# Patient Record
Sex: MALE | Race: WHITE | NOT HISPANIC OR LATINO | Employment: OTHER | URBAN - METROPOLITAN AREA
[De-identification: names, ages, dates, MRNs, and addresses within clinical notes are randomized per-mention and may not be internally consistent; named-entity substitution may affect disease eponyms.]

---

## 2023-07-05 DIAGNOSIS — J45.909 ASTHMA, UNSPECIFIED ASTHMA SEVERITY, UNSPECIFIED WHETHER COMPLICATED, UNSPECIFIED WHETHER PERSISTENT (HHS-HCC): Primary | ICD-10-CM

## 2023-07-07 RX ORDER — MONTELUKAST SODIUM 10 MG/1
TABLET ORAL
Qty: 90 TABLET | Refills: 0 | Status: SHIPPED | OUTPATIENT
Start: 2023-07-07

## 2023-10-20 ENCOUNTER — APPOINTMENT (OUTPATIENT)
Dept: PRIMARY CARE | Facility: CLINIC | Age: 78
End: 2023-10-20
Payer: MEDICARE

## 2023-10-24 ENCOUNTER — APPOINTMENT (OUTPATIENT)
Dept: PRIMARY CARE | Facility: CLINIC | Age: 78
End: 2023-10-24
Payer: MEDICARE

## 2023-10-29 DIAGNOSIS — J45.909 REACTIVE AIRWAY DISEASE WITHOUT COMPLICATION, UNSPECIFIED ASTHMA SEVERITY, UNSPECIFIED WHETHER PERSISTENT (HHS-HCC): Primary | ICD-10-CM

## 2023-10-31 RX ORDER — ALBUTEROL SULFATE 90 UG/1
1 AEROSOL, METERED RESPIRATORY (INHALATION) EVERY 4 HOURS PRN
Qty: 36 G | Refills: 3 | Status: SHIPPED | OUTPATIENT
Start: 2023-10-31

## 2024-01-05 ENCOUNTER — TELEPHONE (OUTPATIENT)
Dept: PRIMARY CARE | Facility: CLINIC | Age: 79
End: 2024-01-05
Payer: MEDICARE

## 2024-01-05 DIAGNOSIS — G50.0 TRIGEMINAL NEURALGIA: Primary | ICD-10-CM

## 2024-01-05 RX ORDER — CARBAMAZEPINE 200 MG/1
200 TABLET ORAL 2 TIMES DAILY
COMMUNITY
Start: 2019-10-27

## 2024-01-05 RX ORDER — CARBAMAZEPINE 300 MG/1
300 CAPSULE, EXTENDED RELEASE ORAL 3 TIMES DAILY
COMMUNITY
End: 2024-01-05 | Stop reason: SDUPTHER

## 2024-01-05 RX ORDER — CARBAMAZEPINE 300 MG/1
300 CAPSULE, EXTENDED RELEASE ORAL 3 TIMES DAILY
Qty: 90 CAPSULE | Refills: 0 | Status: SHIPPED | OUTPATIENT
Start: 2024-01-05 | End: 2024-05-21 | Stop reason: WASHOUT

## 2024-01-05 NOTE — TELEPHONE ENCOUNTER
Patient stopped in today and would like a refill on his prescription Carbamazephine 300 mg per capsules  [facility] [phone]       Refill request Zolpidem 10 mg tab; take one at bedtime prn  Dispense 30; 2 refills  Last reorder 9-18-18  Last visit: 12-10-18  Next visit: 1-10-19  Narcotic signed: 4-19-18  PDMP accessed   UDS: 1-9-16  Pharmacy [facility]

## 2024-01-07 ENCOUNTER — HOSPITAL ENCOUNTER (OUTPATIENT)
Dept: RADIOLOGY | Facility: EXTERNAL LOCATION | Age: 79
Discharge: HOME | End: 2024-01-07

## 2024-01-07 DIAGNOSIS — R07.81 RIB PAIN ON RIGHT SIDE: ICD-10-CM

## 2024-04-19 ENCOUNTER — PATIENT OUTREACH (OUTPATIENT)
Dept: PRIMARY CARE | Facility: CLINIC | Age: 79
End: 2024-04-19
Payer: MEDICARE

## 2024-05-21 ENCOUNTER — OFFICE VISIT (OUTPATIENT)
Dept: PRIMARY CARE | Facility: CLINIC | Age: 79
End: 2024-05-21
Payer: MEDICARE

## 2024-05-21 VITALS
DIASTOLIC BLOOD PRESSURE: 77 MMHG | SYSTOLIC BLOOD PRESSURE: 162 MMHG | WEIGHT: 162.4 LBS | HEIGHT: 67 IN | OXYGEN SATURATION: 94 % | BODY MASS INDEX: 25.49 KG/M2 | HEART RATE: 84 BPM

## 2024-05-21 DIAGNOSIS — Z87.891 SMOKING HX: ICD-10-CM

## 2024-05-21 DIAGNOSIS — I63.9 CEREBROVASCULAR ACCIDENT (CVA), UNSPECIFIED MECHANISM (MULTI): ICD-10-CM

## 2024-05-21 DIAGNOSIS — I69.354 HEMIPLEGIA AND HEMIPARESIS FOLLOWING CEREBRAL INFARCTION AFFECTING LEFT NON-DOMINANT SIDE (MULTI): ICD-10-CM

## 2024-05-21 DIAGNOSIS — Z79.899 MEDICATION MANAGEMENT: Primary | ICD-10-CM

## 2024-05-21 DIAGNOSIS — I10 HYPERTENSION, UNSPECIFIED TYPE: ICD-10-CM

## 2024-05-21 DIAGNOSIS — I48.91 ATRIAL FIBRILLATION, UNSPECIFIED TYPE (MULTI): ICD-10-CM

## 2024-05-21 DIAGNOSIS — J43.9 PULMONARY EMPHYSEMA, UNSPECIFIED EMPHYSEMA TYPE (MULTI): ICD-10-CM

## 2024-05-21 PROCEDURE — 99215 OFFICE O/P EST HI 40 MIN: CPT | Performed by: INTERNAL MEDICINE

## 2024-05-21 PROCEDURE — 3077F SYST BP >= 140 MM HG: CPT | Performed by: INTERNAL MEDICINE

## 2024-05-21 PROCEDURE — 1159F MED LIST DOCD IN RCRD: CPT | Performed by: INTERNAL MEDICINE

## 2024-05-21 PROCEDURE — 3078F DIAST BP <80 MM HG: CPT | Performed by: INTERNAL MEDICINE

## 2024-05-21 RX ORDER — IBUPROFEN 200 MG
1 TABLET ORAL
Qty: 14 PATCH | Refills: 0 | Status: SHIPPED | OUTPATIENT
Start: 2024-05-21

## 2024-05-21 RX ORDER — IPRATROPIUM BROMIDE AND ALBUTEROL 20; 100 UG/1; UG/1
1 SPRAY, METERED RESPIRATORY (INHALATION)
COMMUNITY

## 2024-05-21 RX ORDER — IBUPROFEN 200 MG
1 TABLET ORAL
COMMUNITY
Start: 2024-04-30 | End: 2024-05-21 | Stop reason: SDUPTHER

## 2024-05-21 RX ORDER — NICOTINE 7MG/24HR
1 PATCH, TRANSDERMAL 24 HOURS TRANSDERMAL EVERY 24 HOURS
Qty: 30 PATCH | Refills: 0 | Status: SHIPPED | OUTPATIENT
Start: 2024-05-21 | End: 2024-06-20

## 2024-05-21 RX ORDER — AMLODIPINE BESYLATE 10 MG/1
10 TABLET ORAL
COMMUNITY

## 2024-05-21 RX ORDER — ATORVASTATIN CALCIUM 40 MG/1
40 TABLET, FILM COATED ORAL
COMMUNITY
Start: 2024-05-08

## 2024-05-21 RX ORDER — CHOLECALCIFEROL (VITAMIN D3) 50 MCG
50 TABLET ORAL DAILY
COMMUNITY

## 2024-05-21 RX ORDER — NAPROXEN SODIUM 220 MG/1
81 TABLET, FILM COATED ORAL
COMMUNITY
Start: 2024-04-30

## 2024-05-21 RX ORDER — TALC
6 POWDER (GRAM) TOPICAL
COMMUNITY
Start: 2024-05-08

## 2024-05-21 RX ORDER — APIXABAN 2.5 MG/1
2.5 TABLET, FILM COATED ORAL 2 TIMES DAILY
COMMUNITY

## 2024-05-21 RX ORDER — LOSARTAN POTASSIUM 25 MG/1
25 TABLET ORAL
COMMUNITY
Start: 2024-05-13 | End: 2024-06-04 | Stop reason: WASHOUT

## 2024-05-21 RX ORDER — IBUPROFEN 200 MG
1 TABLET ORAL EVERY 24 HOURS
Qty: 30 PATCH | Refills: 0 | Status: SHIPPED | OUTPATIENT
Start: 2024-05-21 | End: 2024-06-20

## 2024-05-21 RX ORDER — BISMUTH SUBSALICYLATE 262 MG
1 TABLET,CHEWABLE ORAL DAILY
COMMUNITY

## 2024-05-21 RX ORDER — GUAIFENESIN 600 MG/1
600 TABLET, EXTENDED RELEASE ORAL 2 TIMES DAILY
COMMUNITY

## 2024-05-21 RX ORDER — DICLOFENAC SODIUM 10 MG/G
4 GEL TOPICAL 3 TIMES DAILY
COMMUNITY
Start: 2022-12-07

## 2024-05-21 RX ORDER — DOCUSATE SODIUM 100 MG/1
100 CAPSULE, LIQUID FILLED ORAL 2 TIMES DAILY
COMMUNITY

## 2024-05-21 RX ORDER — FLUTICASONE FUROATE AND VILANTEROL 100; 25 UG/1; UG/1
POWDER RESPIRATORY (INHALATION)
COMMUNITY

## 2024-05-21 ASSESSMENT — PATIENT HEALTH QUESTIONNAIRE - PHQ9
SUM OF ALL RESPONSES TO PHQ9 QUESTIONS 1 AND 2: 0
1. LITTLE INTEREST OR PLEASURE IN DOING THINGS: NOT AT ALL
2. FEELING DOWN, DEPRESSED OR HOPELESS: NOT AT ALL

## 2024-05-21 NOTE — PROGRESS NOTES
Subjective   Patient ID: [id number]     Haroon Parsons is a 78 y.o. male who presents for follow up from  [facility] for Stroke.    Current Outpatient Medications:     albuterol 90 mcg/actuation inhaler, INHALE 1 PUFF BY MOUTH EVERY 4 HOURS AS NEEDED, Disp: 36 g, Rfl: 3    amLODIPine (Norvasc) 10 mg tablet, Take 1 tablet (10 mg) by mouth once daily in the morning. Take before meals., Disp: , Rfl:     aspirin 81 mg chewable tablet, Chew 1 tablet (81 mg) once daily., Disp: , Rfl:     atorvastatin (Lipitor) 40 mg tablet, Take 1 tablet (40 mg) by mouth once daily., Disp: , Rfl:     calcium carb/vit D3/minerals (CALCIUM-VITAMIN D ORAL), Take by mouth., Disp: , Rfl:     carBAMazepine (TEGretol) 200 mg tablet, Take 1 tablet (200 mg) by mouth twice a day., Disp: , Rfl:     cholecalciferol (Vitamin D3) 50 MCG (2000 UT) tablet, Take 1 tablet (50 mcg) by mouth once daily., Disp: , Rfl:     diclofenac sodium (Voltaren) 1 % gel, Apply 4.5 inches (4 g) topically 3 times a day., Disp: , Rfl:     docusate sodium (Colace) 100 mg capsule, Take 1 capsule (100 mg) by mouth 2 times a day., Disp: , Rfl:     Eliquis 2.5 mg tablet, Take 1 tablet (2.5 mg) by mouth 2 times a day., Disp: , Rfl:     fluticasone furoate-vilanteroL (Breo Ellipta) 100-25 mcg/dose inhaler, Inhale., Disp: , Rfl:     guaiFENesin (Mucinex) 600 mg 12 hr tablet, Take 1 tablet (600 mg) by mouth 2 times a day., Disp: , Rfl:     ipratropium-albuteroL (Combivent Respimat)  mcg/actuation inhaler, Inhale 1 puff 4 times a day., Disp: , Rfl:     losartan (Cozaar) 25 mg tablet, Take 1 tablet (25 mg) by mouth once daily., Disp: , Rfl:     melatonin 3 mg tablet, Take 2 tablets (6 mg) by mouth once daily., Disp: , Rfl:     montelukast (Singulair) 10 mg tablet, TAKE 1 TABLET BY MOUTH EVERY DAY, Disp: 90 tablet, Rfl: 0    multivitamin tablet, Take 1 tablet by mouth once daily., Disp: , Rfl:     nicotine (Nicoderm CQ) 21 mg/24 hr patch, Place 1 patch on the skin once daily.,  Disp: , Rfl:   HPI  78-year-old patient presented to the office today accompanied by his wife for post discharge follow-up.  Patient was admitted to [facility] on April 19 with a stroke and discharged on April 29 to [facility] rehab in [place].  He was discharged from the rehab on May 12 patient was discharged home with home health services.    Patient is known to have past medical history of cerebrovascular accident with left-sided hemiparesis in the past as well at history of right carotid stenosis status post endarterectomy in April 2014, patient is also known to have history of COPD hyperlipidemia trigeminal neuralgia ongoing tobacco use.    Patient was found by his wife on the floor and he was noted to have weakness and slurring the speech she was brought to the emergency room imaging was completed patient was diagnosed with acute cortical infarct of the right MCA.  Patient's medication regimen was modified and he was discharged home on aspirin and Eliquis as well as Lipitor Home health services have been coming daily    And he is working with PT OT and speech therapy as well as skilled nursing.  His blood pressure at home has been adequate the wife stated that his blood pressure was excellent except for yesterday it was high systolic was 150.  He feels good his speech has improved he is stable he denies any chest pain or shortness of breath not even on exertion he does not have abdominal pain he had rectus sheath hematoma and his last hemoglobin was normal in at the time of discharge from the rehab facility.    Patient is scheduled to follow-up with neurology on May 29 in [facility].  They have no specific complaints or concerns today.  Review of system was reviewed all normal except what is noted in HPI   Past Medical History:   Diagnosis Date    Trigeminal neuralgia 01/21/2014    Trigeminal neuralgia      Objective   /77 (BP Location: Right arm, Patient Position: Sitting)   Pulse 84    "Ht 1.702 m (5' 7\")   Wt 73.7 kg (162 lb 6.4 oz)   SpO2 94%   BMI 25.44 kg/m²      Physical Exam  Constitutional:       Appearance: Normal appearance.   Cardiovascular:      Rate and Rhythm: Normal rate and regular rhythm.      Pulses: Normal pulses.      Heart sounds: Normal heart sounds.   Pulmonary:      Effort: Pulmonary effort is normal.      Breath sounds: Normal breath sounds.   Neurological:      Mental Status: He is alert.         Assessment/Plan   Problem List Items Addressed This Visit    None  78-year-old patient with the following issues.    1.  Status post recent hospitalization as well as stay in a rehab facility currently discharged home with home health services for a diagnosis of stroke with evidence of small acute cortical infarct in the right MCA currently on aspirin and Eliquis as well as Lipitor for stroke prophylaxis and he is working with speech therapy physical therapy and Occupational Therapy he is compliant with his medications and he is going to meet with neurology in follow-up on May 29.    2.  Hypertension his blood pressure is high I want him to increase his losartan to 50 mg p.o. daily and I want his blood pressure to be monitored I instructed the wife to contact my office on Friday with blood pressure reading.  Nurses could check it when they come for his home health visits.  I am curious when I reviewed his discharge summary it seems he was discharged on metoprolol 12.5 mg p.o. twice daily but I do not see that on his discharge summary it seems it was discontinued at the end.  He is currently on amlodipine 10 mg p.o. daily as well as losartan 25 mg p.o. daily which we will increase to 50 mg p.o. daily.    3.  COPD currently stable continue the current regimen no changes.    4.  Ongoing tobacco use I did have a long conversation with him today regarding the nicotine patches he is in agreement a prescription for 3 different dosage of nicotine patches was sent to the pharmacy we will " start with 21 mg for 2 weeks then 14 mg then 7 mg.  Patient stated understanding.    5.  History of trigeminal neuralgia continue with carbamazepine no changes.    6.  History of rectus sheath hematoma appears to have resolved repeat CBC will be obtained we will discuss the results with him once available.    No other active issues or concerns during this visit I will see the patient back in the office as needed and as previously scheduled.    Angelia Ayala MD

## 2024-05-22 ENCOUNTER — LAB (OUTPATIENT)
Dept: LAB | Facility: LAB | Age: 79
End: 2024-05-22
Payer: MEDICARE

## 2024-05-22 DIAGNOSIS — I63.9 CEREBROVASCULAR ACCIDENT (CVA), UNSPECIFIED MECHANISM (MULTI): ICD-10-CM

## 2024-05-22 DIAGNOSIS — Z79.899 MEDICATION MANAGEMENT: ICD-10-CM

## 2024-05-22 DIAGNOSIS — I10 HYPERTENSION, UNSPECIFIED TYPE: ICD-10-CM

## 2024-05-22 LAB
ANION GAP SERPL CALC-SCNC: 13 MMOL/L (ref 10–20)
BUN SERPL-MCNC: 14 MG/DL (ref 6–23)
CALCIUM SERPL-MCNC: 9.8 MG/DL (ref 8.6–10.6)
CHLORIDE SERPL-SCNC: 103 MMOL/L (ref 98–107)
CO2 SERPL-SCNC: 30 MMOL/L (ref 21–32)
CREAT SERPL-MCNC: 0.59 MG/DL (ref 0.5–1.3)
EGFRCR SERPLBLD CKD-EPI 2021: >90 ML/MIN/1.73M*2
ERYTHROCYTE [DISTWIDTH] IN BLOOD BY AUTOMATED COUNT: 13.4 % (ref 11.5–14.5)
GLUCOSE SERPL-MCNC: 97 MG/DL (ref 74–99)
HCT VFR BLD AUTO: 45.3 % (ref 41–52)
HGB BLD-MCNC: 14.1 G/DL (ref 13.5–17.5)
MCH RBC QN AUTO: 30.3 PG (ref 26–34)
MCHC RBC AUTO-ENTMCNC: 31.1 G/DL (ref 32–36)
MCV RBC AUTO: 97 FL (ref 80–100)
NRBC BLD-RTO: 0 /100 WBCS (ref 0–0)
PLATELET # BLD AUTO: 290 X10*3/UL (ref 150–450)
POTASSIUM SERPL-SCNC: 4.1 MMOL/L (ref 3.5–5.3)
RBC # BLD AUTO: 4.66 X10*6/UL (ref 4.5–5.9)
SODIUM SERPL-SCNC: 142 MMOL/L (ref 136–145)
WBC # BLD AUTO: 10 X10*3/UL (ref 4.4–11.3)

## 2024-05-22 PROCEDURE — 85027 COMPLETE CBC AUTOMATED: CPT

## 2024-05-22 PROCEDURE — 80048 BASIC METABOLIC PNL TOTAL CA: CPT

## 2024-05-22 PROCEDURE — 36415 COLL VENOUS BLD VENIPUNCTURE: CPT

## 2024-05-24 ENCOUNTER — TELEPHONE (OUTPATIENT)
Dept: PRIMARY CARE | Facility: CLINIC | Age: 79
End: 2024-05-24
Payer: MEDICARE

## 2024-05-24 NOTE — TELEPHONE ENCOUNTER
Still a bit high.  Would continue with increased Losartan as Dr. Almaraz has directed.  Continue to monitor and please let her know next week what the readings are doing.  Thanks.

## 2024-05-24 NOTE — TELEPHONE ENCOUNTER
Patients wife called to report his blood pressure readings,     156/93  151/91  154/90 this morning

## 2024-05-28 DIAGNOSIS — I10 HYPERTENSION, UNSPECIFIED TYPE: Primary | ICD-10-CM

## 2024-05-29 NOTE — TELEPHONE ENCOUNTER
Called and spoke with patients wife chana, aware to call in on Friday a give results of blood pressure readings.

## 2024-05-31 ENCOUNTER — TELEPHONE (OUTPATIENT)
Dept: PRIMARY CARE | Facility: CLINIC | Age: 79
End: 2024-05-31
Payer: MEDICARE

## 2024-05-31 NOTE — TELEPHONE ENCOUNTER
Pt's spouse called to advise on his bp reading from the last few days  Wed:  167/69 in the morning  176/76 in the afternoon    Thursday:  160-64 A.M.  165/75 P.M.    Pt is out of his bp medication as well and no refills

## 2024-06-04 RX ORDER — LOSARTAN POTASSIUM 100 MG/1
100 TABLET ORAL DAILY
Qty: 100 TABLET | Refills: 3 | Status: SHIPPED | OUTPATIENT
Start: 2024-06-04 | End: 2025-07-09

## 2024-06-28 DIAGNOSIS — J45.909 REACTIVE AIRWAY DISEASE WITHOUT COMPLICATION, UNSPECIFIED ASTHMA SEVERITY, UNSPECIFIED WHETHER PERSISTENT (HHS-HCC): ICD-10-CM

## 2024-07-01 RX ORDER — ALBUTEROL SULFATE 90 UG/1
1 AEROSOL, METERED RESPIRATORY (INHALATION) EVERY 4 HOURS PRN
Qty: 36 G | Refills: 0 | Status: SHIPPED | OUTPATIENT
Start: 2024-07-01

## 2024-09-12 ENCOUNTER — APPOINTMENT (OUTPATIENT)
Dept: PRIMARY CARE | Facility: CLINIC | Age: 79
End: 2024-09-12
Payer: MEDICARE

## 2024-09-12 VITALS
WEIGHT: 163 LBS | RESPIRATION RATE: 16 BRPM | BODY MASS INDEX: 25.58 KG/M2 | HEIGHT: 67 IN | HEART RATE: 57 BPM | DIASTOLIC BLOOD PRESSURE: 78 MMHG | SYSTOLIC BLOOD PRESSURE: 132 MMHG | OXYGEN SATURATION: 94 %

## 2024-09-12 DIAGNOSIS — R29.6 RECURRENT FALLS: ICD-10-CM

## 2024-09-12 DIAGNOSIS — M25.561 RIGHT KNEE PAIN, UNSPECIFIED CHRONICITY: ICD-10-CM

## 2024-09-12 DIAGNOSIS — R05.9 COUGH, UNSPECIFIED TYPE: ICD-10-CM

## 2024-09-12 DIAGNOSIS — I10 HYPERTENSION, UNSPECIFIED TYPE: ICD-10-CM

## 2024-09-12 DIAGNOSIS — Z00.00 HEALTH CARE MAINTENANCE: Primary | ICD-10-CM

## 2024-09-12 PROCEDURE — G0439 PPPS, SUBSEQ VISIT: HCPCS | Performed by: INTERNAL MEDICINE

## 2024-09-12 PROCEDURE — 1170F FXNL STATUS ASSESSED: CPT | Performed by: INTERNAL MEDICINE

## 2024-09-12 PROCEDURE — G0444 DEPRESSION SCREEN ANNUAL: HCPCS | Performed by: INTERNAL MEDICINE

## 2024-09-12 PROCEDURE — 99214 OFFICE O/P EST MOD 30 MIN: CPT | Performed by: INTERNAL MEDICINE

## 2024-09-12 PROCEDURE — 3078F DIAST BP <80 MM HG: CPT | Performed by: INTERNAL MEDICINE

## 2024-09-12 PROCEDURE — 1158F ADVNC CARE PLAN TLK DOCD: CPT | Performed by: INTERNAL MEDICINE

## 2024-09-12 PROCEDURE — 3075F SYST BP GE 130 - 139MM HG: CPT | Performed by: INTERNAL MEDICINE

## 2024-09-12 PROCEDURE — 1123F ACP DISCUSS/DSCN MKR DOCD: CPT | Performed by: INTERNAL MEDICINE

## 2024-09-12 PROCEDURE — 1159F MED LIST DOCD IN RCRD: CPT | Performed by: INTERNAL MEDICINE

## 2024-09-12 ASSESSMENT — ACTIVITIES OF DAILY LIVING (ADL)
DOING_HOUSEWORK: INDEPENDENT
MANAGING_FINANCES: INDEPENDENT
GROCERY_SHOPPING: INDEPENDENT
DRESSING: INDEPENDENT
BATHING: INDEPENDENT
TAKING_MEDICATION: INDEPENDENT

## 2024-09-12 ASSESSMENT — ENCOUNTER SYMPTOMS
LOSS OF SENSATION IN FEET: 0
DEPRESSION: 0
OCCASIONAL FEELINGS OF UNSTEADINESS: 1

## 2024-09-12 NOTE — PROGRESS NOTES
Annual Medicare Wellness Exam/Comprehensive Problem Focused Follow Up  HPI/CC  Chief Complaint   Patient presents with    Medicare Annual Wellness Visit Subsequent     78-year-old male patient presented to the office today for his annual exam and Medicare wellness exam.    Patient is status post recent hospitalization for stroke and evidence of small acute cortical infarcts in the right middle cerebral artery.  With a history of cerebrovascular accident with left-sided hemiparesis as well as history of trigeminal neuralgia and ongoing tobacco use.   he does have a history of hypertension hyperlipidemia right carotid stenosis status post right carotid endarterectomy in 2014 and restenosis and stroke in July 2014    Patient is doing fairly well he tells me he is doing okay he denies any chest pain or shortness of breath he continues to have a cough he is trying to cut back on his smoking he is currently smoking half pack per day he had this cough for quite a long time no hemoptysis no weight loss in fact he is gaining weight since his hospitalization.  He is eating a lot and he is very happy with that.  He denies abdominal pain nausea vomiting bowel movement changes denies urine symptoms sometimes he goes more often but no burning no urgency.    He does have recurrent falls he continues to complain of knee pain on the right side and he does have left-sided paresis status post stroke he does use the cane and a walker for ambulation he is interested in physical therapy for his recurrent falls to help with his balance and ambulation.  Assessment and Plan:  Problem List Items Addressed This Visit    None  78 patient with the following issues.    1.  Hypertension patient's blood pressure is adequate continue the current medications no changes.    2.  History of stroke with history of small acute cortical infarcts in the right MCA and history of CVA with left hemiparesis.  Actively followed by neurology.  He will continue  "with risk factor modification we will continue with anticoagulation with Eliquis he will continue on his Lipitor and he is currently trying to quit smoking he does have the nicotine patch placed.    3.  Right knee pain previous workup indicated significant severe arthritic changes referred to orthopedic was provided.    4.  Recurrent falls I worry about him with his recurrent falling episodes referral to physical therapy was provided and I believe if we take care of his right knee he will definitely be ambulating in a safer way I did emphasize the importance of using the cane and a walker at all times.    5.  Cough chest x-ray will be obtained likely related to his ongoing tobacco use which she is currently working on quitting.    6.  Increased urinary frequency urine analysis will be obtained we will discuss results with him once available.    7.  Hyperlipidemia on atorvastatin the plan is to continue lipid profile is requested we will discuss results with him once available.    8.  Healthcare maintenance issues lab work is requested and we will discuss the results with him once available.  Preventive care issues are up-to-date.    Medicare wellness visit was completed with its requirements.    ROS otherwise negative aside from what was mentioned above in HPI.    Vitals  /78   Pulse 57   Resp 16   Ht 1.702 m (5' 7\")   Wt 73.9 kg (163 lb)   SpO2 94%   BMI 25.53 kg/m²   Body mass index is 25.53 kg/m².  Physical Exam  Gen: Alert, NAD  HEENT:  Unremarkable  Neck:  No QUENTIN  Respiratory:  Lungs CTAB  Cardiovascular:  Heart RRR  Neuro:  Gross motor and sensory intact  Skin:  No suspicious lesions present  Breast: No masses, or axillary lymphadenopathy      Patient Care Team:  Angelia Ayala MD as PCP - General (Internal Medicine)  Angelia Ayala MD as PCP - [facility] Attributed Provider       Health Risk Assessment:  Patient was asked if he/she has any difficulty performing the following activities of " daily living:  Preparing nutritious food and eating? Yes  Grocery shopping? No  Bathing and grooming yourself? No  Getting dressed? No  Using the toilet?No  Using the phone? No  Moving around from place to place (physical ambulation)? No  Doing housework (including laundry) independently? Yes  Managing finances independently? No  Managing medications independently? No  Doing housework (including laundry) independently? Yes    Patient was asked if he/she:  Feels safe in current home environment?: Yes  Uses seatbelt? Yes  Sees the dentist regularly? Yes  Exercises regularly: No  Suffers from depression, stress, anger, loneliness or social isolation, pain, suicidality? No    Dietary issues discussed: Yes  Cognitive Impairment Yes  Hearing difficulties: No  Visual Acuity assessed: Yes    What is your self-assessment of overall health status and life satisfaction? Fair     5 minutes spent on SDOH screening:   Specifically Housing, Food Insecurity, Utilities and Transportatin Needs were evaluated   (See Screenings in Rooming section for documentation)  Food Insecurity: No Food Insecurity (4/22/2024)    Received from [facility]    Hunger Vital Sign     Worried About Running Out of Food in the Last Year: Never true     Ran Out of Food in the Last Year: Never true     Housing Stability: Low Risk  (4/22/2024)    Received from [facility]    Housing Stability Vital Sign     Unable to Pay for Housing in the Last Year: No     Number of Places Lived in the Last Year: 1     Unstable Housing in the Last Year: No     Transportation Needs: No Transportation Needs (4/22/2024)    Received from [facility]    PRAPARE - Transportation     Lack of Transportation (Medical): No     Lack of Transportation (Non-Medical): No       Allergies and Medications  Patient has no known allergies.  Current Outpatient Medications   Medication Instructions    albuterol 90 mcg/actuation  inhaler 1 puff, Every 4 hours PRN    amLODIPine (NORVASC) 10 mg, oral, Daily before breakfast    aspirin 81 mg, oral, Daily RT    atorvastatin (LIPITOR) 40 mg, oral, Daily RT    calcium carb/vit D3/minerals (CALCIUM-VITAMIN D ORAL) oral    carBAMazepine (TEGRETOL) 200 mg, oral, 2 times daily    cholecalciferol (VITAMIN D3) 50 mcg, oral, Daily    diclofenac sodium (VOLTAREN) 4 g, Topical, 3 times daily    docusate sodium (COLACE) 100 mg, oral, 2 times daily    Eliquis 2.5 mg, oral, 2 times daily    fluticasone furoate-vilanteroL (Breo Ellipta) 100-25 mcg/dose inhaler inhalation    guaiFENesin (MUCINEX) 600 mg, oral, 2 times daily    ipratropium-albuteroL (Combivent Respimat)  mcg/actuation inhaler 1 puff, inhalation, 4 times daily RT    losartan (COZAAR) 100 mg, oral, Daily    melatonin 6 mg, oral, Daily RT    montelukast (Singulair) 10 mg tablet TAKE 1 TABLET BY MOUTH EVERY DAY    multivitamin tablet 1 tablet, oral, Daily    nicotine (Nicoderm CQ) 14 mg/24 hr patch 1 patch, transdermal, Every 24 hours    nicotine (Nicoderm CQ) 21 mg/24 hr patch 1 patch, transdermal, Daily RT    nicotine (Nicoderm CQ) 7 mg/24 hr patch 1 patch, transdermal, Every 24 hours       Medications and Supplements  prescribed by me and other practitioners or clinical pharmacist (such as prescriptions, OTC's, herbal therapies and supplements) were reviewed and documented in the medical record.      Active Problem List  Patient Active Problem List   Diagnosis    Hemiplegia and hemiparesis following cerebral infarction affecting left non-dominant side (Multi)    Pulmonary emphysema, unspecified emphysema type (Multi)    Atrial fibrillation, unspecified type (Multi)       Comprehensive Medical/Surgical/Social/Family History  Past Medical History:   Diagnosis Date    Trigeminal neuralgia 01/21/2014    Trigeminal neuralgia     Past Surgical History:   Procedure Laterality Date    MR HEAD ANGIO WO IV CONTRAST  11/16/2015    MR HEAD ANGIO WO IV  CONTRAST 11/16/2015 [facility]    OTHER SURGICAL HISTORY  08/12/2013    Wrist Surgery    OTHER SURGICAL HISTORY  10/31/2019    Gamma knife radiosurgery    OTHER SURGICAL HISTORY  04/09/2014    PTA Common Carotid Artery Right     Social History     Social History Narrative    Not on file         Tobacco/Alcohol/Opioid use, as well as Illicit Drug Use was screened for/reviewed and documented in Social Documentation section of the chart and medication list as appropriate     Depression Screening  Depression screening completed using the PHQ-2 questions, with results documented in the chart/encounter (5 min spent on this).  (See Rooming Screening section for documentation, and/or progress note for additional information)    Cardiac Risk Assessment (15 minutes spent on this)  The ASCVD Risk score (Armando CLARKE, et al., 2019) failed to calculate for the following reasons:    The patient has a prior MI or stroke diagnosis    Cardiovascular risk was discussed and, if needed, lifestyle modifications recommended, including nutritional choices, exercise, and elimination of habits contributing to risk. We agreed on a plan to reduce the current cardiovascular risk based on above discussion as needed.     Aspirin use/disuse was discussed and documented in the Problem List of the medical record (under Cardiac Risk Counseling) after reviewing the updated guidelines below:  Consider low dose Aspirin ( mg) use if the benefit for cardiovascular disease prevention outweighs risk for bleeding complications.   Discussed that in general, low dose ASA should be considered:  In patients WITHOUT prior MI/stroke/PAD (primary prevention):   a. Age <60: Use if 10-year cardiovascular disease risk >20%, with discussion of risks and benefits with patient  b. Age 60-<70: Use if 10-year cardiovascular disease risk >20% and low bleeding (e.g., gastrointestinal) risk, with discussion of risks and benefits with patient  c. Age >=70: Do not  use    In patients WITH prior MI/stroke/PAD (secondary prevention):   Generally use unless extremely high bleeding (e.g., gastrointenstinal) risk, with discussion of risks and benefits with patient    Advance Directives Discussion  Advanced Care Planning (including a Living Will, Healthcare POA, as well as specific end of life choices and/or directives), was discussed with the patient and/or surrogate, voluntarily, and details of that discussion documented in the Problem List (under Advanced Directives Discussion) of the medical record.   (16 min spent discussing above)     During the course of the visit the patient was educated and counseled about age appropriate screening and preventive services.   Completed preventive screenings were documented in the chart (see Routine Health Maintenance in Problem List) and orders were placed for outstanding screenings/procedures as documented in the Assessment and Plan.    Patient Instructions (the written plan) was given to the patient at check out as part of the AVS.

## 2024-09-16 ENCOUNTER — LAB (OUTPATIENT)
Dept: LAB | Facility: LAB | Age: 79
End: 2024-09-16
Payer: MEDICARE

## 2024-09-16 DIAGNOSIS — I10 HYPERTENSION, UNSPECIFIED TYPE: ICD-10-CM

## 2024-09-16 DIAGNOSIS — Z00.00 HEALTH CARE MAINTENANCE: ICD-10-CM

## 2024-09-16 LAB
ALBUMIN SERPL BCP-MCNC: 4.2 G/DL (ref 3.4–5)
ALP SERPL-CCNC: 83 U/L (ref 33–136)
ALT SERPL W P-5'-P-CCNC: 15 U/L (ref 10–52)
ANION GAP SERPL CALC-SCNC: 12 MMOL/L (ref 10–20)
AST SERPL W P-5'-P-CCNC: 16 U/L (ref 9–39)
BILIRUB SERPL-MCNC: 0.6 MG/DL (ref 0–1.2)
BUN SERPL-MCNC: 12 MG/DL (ref 6–23)
CALCIUM SERPL-MCNC: 9.6 MG/DL (ref 8.6–10.6)
CHLORIDE SERPL-SCNC: 103 MMOL/L (ref 98–107)
CHOLEST SERPL-MCNC: 181 MG/DL (ref 0–199)
CHOLESTEROL/HDL RATIO: 2.5
CO2 SERPL-SCNC: 31 MMOL/L (ref 21–32)
CREAT SERPL-MCNC: 0.64 MG/DL (ref 0.5–1.3)
EGFRCR SERPLBLD CKD-EPI 2021: >90 ML/MIN/1.73M*2
ERYTHROCYTE [DISTWIDTH] IN BLOOD BY AUTOMATED COUNT: 15 % (ref 11.5–14.5)
GLUCOSE SERPL-MCNC: 91 MG/DL (ref 74–99)
HCT VFR BLD AUTO: 42.9 % (ref 41–52)
HDLC SERPL-MCNC: 72.6 MG/DL
HGB BLD-MCNC: 14.1 G/DL (ref 13.5–17.5)
LDLC SERPL CALC-MCNC: 97 MG/DL
MCH RBC QN AUTO: 31.7 PG (ref 26–34)
MCHC RBC AUTO-ENTMCNC: 32.9 G/DL (ref 32–36)
MCV RBC AUTO: 96 FL (ref 80–100)
NON HDL CHOLESTEROL: 108 MG/DL (ref 0–149)
NRBC BLD-RTO: 0 /100 WBCS (ref 0–0)
PLATELET # BLD AUTO: 252 X10*3/UL (ref 150–450)
POTASSIUM SERPL-SCNC: 4.5 MMOL/L (ref 3.5–5.3)
PROT SERPL-MCNC: 6.8 G/DL (ref 6.4–8.2)
RBC # BLD AUTO: 4.45 X10*6/UL (ref 4.5–5.9)
SODIUM SERPL-SCNC: 141 MMOL/L (ref 136–145)
TRIGL SERPL-MCNC: 56 MG/DL (ref 0–149)
TSH SERPL-ACNC: 2.3 MIU/L (ref 0.44–3.98)
VLDL: 11 MG/DL (ref 0–40)
WBC # BLD AUTO: 7.7 X10*3/UL (ref 4.4–11.3)

## 2024-09-16 PROCEDURE — 36415 COLL VENOUS BLD VENIPUNCTURE: CPT

## 2024-09-16 PROCEDURE — 84153 ASSAY OF PSA TOTAL: CPT

## 2024-09-16 PROCEDURE — 84154 ASSAY OF PSA FREE: CPT

## 2024-09-17 ENCOUNTER — HOSPITAL ENCOUNTER (OUTPATIENT)
Dept: RADIOLOGY | Facility: CLINIC | Age: 79
Discharge: HOME | End: 2024-09-17
Payer: MEDICARE

## 2024-09-17 ENCOUNTER — LAB (OUTPATIENT)
Dept: LAB | Facility: LAB | Age: 79
End: 2024-09-17
Payer: MEDICARE

## 2024-09-17 DIAGNOSIS — R05.9 COUGH, UNSPECIFIED TYPE: ICD-10-CM

## 2024-09-17 DIAGNOSIS — Z00.00 HEALTH CARE MAINTENANCE: ICD-10-CM

## 2024-09-17 LAB
APPEARANCE UR: CLEAR
BILIRUB UR STRIP.AUTO-MCNC: NEGATIVE MG/DL
COLOR UR: YELLOW
GLUCOSE UR STRIP.AUTO-MCNC: NORMAL MG/DL
KETONES UR STRIP.AUTO-MCNC: NEGATIVE MG/DL
LEUKOCYTE ESTERASE UR QL STRIP.AUTO: NEGATIVE
NITRITE UR QL STRIP.AUTO: NEGATIVE
PH UR STRIP.AUTO: 5.5 [PH]
PROT UR STRIP.AUTO-MCNC: NEGATIVE MG/DL
RBC # UR STRIP.AUTO: NEGATIVE /UL
SP GR UR STRIP.AUTO: 1.02
UROBILINOGEN UR STRIP.AUTO-MCNC: NORMAL MG/DL

## 2024-09-17 PROCEDURE — 81003 URINALYSIS AUTO W/O SCOPE: CPT

## 2024-09-17 PROCEDURE — 71046 X-RAY EXAM CHEST 2 VIEWS: CPT | Performed by: STUDENT IN AN ORGANIZED HEALTH CARE EDUCATION/TRAINING PROGRAM

## 2024-09-17 PROCEDURE — 71046 X-RAY EXAM CHEST 2 VIEWS: CPT

## 2024-09-19 DIAGNOSIS — R05.9 COUGH, UNSPECIFIED TYPE: Primary | ICD-10-CM

## 2024-09-19 DIAGNOSIS — F17.200 SMOKER: ICD-10-CM

## 2024-09-19 LAB
PSA FREE MFR SERPL: 33 %
PSA FREE SERPL-MCNC: 0.3 NG/ML
PSA SERPL IA-MCNC: 0.9 NG/ML (ref 0–4)

## 2024-10-14 ENCOUNTER — HOSPITAL ENCOUNTER (OUTPATIENT)
Dept: RADIOLOGY | Facility: EXTERNAL LOCATION | Age: 79
Discharge: HOME | End: 2024-10-14

## 2024-10-14 ENCOUNTER — HOSPITAL ENCOUNTER (OUTPATIENT)
Dept: RADIOLOGY | Facility: CLINIC | Age: 79
Discharge: HOME | End: 2024-10-14
Payer: MEDICARE

## 2024-10-14 ENCOUNTER — OFFICE VISIT (OUTPATIENT)
Dept: ORTHOPEDIC SURGERY | Facility: CLINIC | Age: 79
End: 2024-10-14
Payer: MEDICARE

## 2024-10-14 DIAGNOSIS — M25.561 RIGHT KNEE PAIN, UNSPECIFIED CHRONICITY: ICD-10-CM

## 2024-10-14 DIAGNOSIS — I69.354 HEMIPLEGIA AND HEMIPARESIS FOLLOWING CEREBRAL INFARCTION AFFECTING LEFT NON-DOMINANT SIDE (MULTI): ICD-10-CM

## 2024-10-14 DIAGNOSIS — M17.11 PRIMARY OSTEOARTHRITIS OF RIGHT KNEE: Primary | ICD-10-CM

## 2024-10-14 PROCEDURE — 20611 DRAIN/INJ JOINT/BURSA W/US: CPT | Mod: RT | Performed by: FAMILY MEDICINE

## 2024-10-14 PROCEDURE — 2500000004 HC RX 250 GENERAL PHARMACY W/ HCPCS (ALT 636 FOR OP/ED): Performed by: FAMILY MEDICINE

## 2024-10-14 PROCEDURE — 73564 X-RAY EXAM KNEE 4 OR MORE: CPT | Mod: RIGHT SIDE | Performed by: RADIOLOGY

## 2024-10-14 PROCEDURE — 99214 OFFICE O/P EST MOD 30 MIN: CPT | Performed by: FAMILY MEDICINE

## 2024-10-14 PROCEDURE — 73564 X-RAY EXAM KNEE 4 OR MORE: CPT | Mod: RT

## 2024-10-14 PROCEDURE — 1123F ACP DISCUSS/DSCN MKR DOCD: CPT | Performed by: FAMILY MEDICINE

## 2024-10-14 PROCEDURE — 99204 OFFICE O/P NEW MOD 45 MIN: CPT | Performed by: FAMILY MEDICINE

## 2024-10-14 RX ORDER — LIDOCAINE HYDROCHLORIDE 10 MG/ML
4 INJECTION, SOLUTION INFILTRATION; PERINEURAL
Status: COMPLETED | OUTPATIENT
Start: 2024-10-14 | End: 2024-10-14

## 2024-10-14 RX ORDER — ROPIVACAINE HYDROCHLORIDE 5 MG/ML
4 INJECTION, SOLUTION EPIDURAL; INFILTRATION; PERINEURAL
Status: COMPLETED | OUTPATIENT
Start: 2024-10-14 | End: 2024-10-14

## 2024-10-14 RX ORDER — METHYLPREDNISOLONE ACETATE 40 MG/ML
80 INJECTION, SUSPENSION INTRA-ARTICULAR; INTRALESIONAL; INTRAMUSCULAR; SOFT TISSUE
Status: COMPLETED | OUTPATIENT
Start: 2024-10-14 | End: 2024-10-14

## 2024-10-14 NOTE — LETTER
October 14, 2024     Angelia Ayala MD  [address]  [facility]  [facility] [address]    Patient: Haroon Parsons   YOB: 1945   Date of Visit: 10/14/2024       Dear Dr. Angelia Ayala MD:    Thank you for referring Haroon Parsons to me for evaluation. Below are my notes for this consultation.  If you have questions, please do not hesitate to call me. I look forward to following your patient along with you.       Sincerely,     Devon Davila MD      CC: No Recipients  ______________________________________________________________________________________    Patient ID: Haroon Parsons is a 78 y.o. male.    L Inj/Asp: R knee on 10/14/2024 1:54 PM  Indications: pain  Details: 22 G needle, ultrasound-guided superolateral approach  Medications: 4 mL lidocaine 10 mg/mL (1 %); 4 mL ropivacaine 5 mg/mL (0.5 %); 80 mg methylPREDNISolone acetate 40 mg/mL  Outcome: tolerated well, no immediate complications  Procedure, treatment alternatives, risks and benefits explained, specific risks discussed. Consent was given by the patient. Immediately prior to procedure a time out was called to verify the correct patient, procedure, equipment, support staff and site/side marked as required. Patient was prepped and draped in the usual sterile fashion.           Sports Medicine Office Note    Today's Date:  10/14/2024     HPI: Haroon Parsons is a 78 y.o. 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    Today, 10/14/2024, he presents for evaluation of chronic right knee pain.  He is a poor historian and does not come with any family members.  He states he lives with his wife in his own home in a multilevel dwelling but mainly stays on the ground floor.  He has a history of recent CVA that has left him hemiplegic of the left  upper and lower extremities.  He ambulates with a cane and assistance in his right leg.  He has chronic right knee pain worse to the medial side.  He denies instability.  He is unsure of previous treatments but [facility] reveals he [place]. Drew [place] [facility], receiving cortisone injections on 1/12/2023 and again on 4/20/2023.  It is documented by Dr. Drew the patient had good relief; but the patient states it did not help him.  He denies any recent injury or trauma to his knee.  Has no problems with the left knee.    He has no other complaints.    Physical Examination:     The RIGHT knee has trace joint effusion. Patella crepitus and grind are positive. There is minimal tenderness to the medial and lateral joint lines. Flexion and extension are without mechanical blocking. There is no instability with stress testing.   The LEFT knee is nontender and stable.  Skin - no rashes, sores, or open lesions. Strength, sensory and vascular exams are otherwise normal. There is no clubbing, cyanosis or edema.  He has obvious limited use of the left upper extremity.  Gait is slightly antalgic and cane assisted.    Imaging:  Radiographs of the right knee obtained today were reviewed and revealed severe end-stage medial compartment and severe patellofemoral compartment arthrosis.  There are no signs of acute fractures or dislocations.  There is significant advancement to the medial compartment as compared to x-rays from January 2023 available in our health system.  The studies were reviewed by me personally in the office today.      Procedure:  After consent was obtained, the right knee was prepped in a sterile fashion. Ultrasound guidance was used to help insure proper needle placement into the knee joint, decrease patient discomfort, and decrease collateral damage. The joint was visualized and Depo-Medrol 80 mg with lidocaine 4 mL & ropivacaine 4 mL were injected without any complications. Ultrasound images were saved on an  internal file for later reference. The patient tolerated the procedure well and the area was cleaned and bandaged.    Problem List Items Addressed This Visit             ICD-10-CM    Hemiplegia and hemiparesis following cerebral infarction affecting left non-dominant side (Multi) I69.354     Other Visit Diagnoses         Codes    Primary osteoarthritis of right knee    -  Primary M17.11    Relevant Orders    Point of Care Ultrasound (Completed)    Right knee pain, unspecified chronicity     M25.561    Relevant Orders    XR knee right 4+ views            Assessment and Plan:     We reviewed the exam and x-ray findings and discussed the conservative and surgical treatment options. We agreed to treat his pain conservatively with a cortisone injection at the right knee.  He tolerated this well.  Activity modifications were reviewed.  I encouraged him to maximize Tylenol.  He does have end-stage right knee DJD and would clearly benefit from joint replacement but due to his age and medical comorbidities he is probably not eligible for surgery.  He does not have instability and therefore would not benefit from a knee brace.  He will follow-up in 1 month to see how he responded to the cortisone.    **This note was dictated using Dragon speech recognition software and was not corrected for spelling or grammatical errors**.    Devon Davila MD  Sports Medicine Specialist  [facility] [place]

## 2024-10-14 NOTE — PROGRESS NOTES
Patient ID: Haroon Parsons is a 78 y.o. male.    L Inj/Asp: R knee on 10/14/2024 1:54 PM  Indications: pain  Details: 22 G needle, ultrasound-guided superolateral approach  Medications: 4 mL lidocaine 10 mg/mL (1 %); 4 mL ropivacaine 5 mg/mL (0.5 %); 80 mg methylPREDNISolone acetate 40 mg/mL  Outcome: tolerated well, no immediate complications  Procedure, treatment alternatives, risks and benefits explained, specific risks discussed. Consent was given by the patient. Immediately prior to procedure a time out was called to verify the correct patient, procedure, equipment, support staff and site/side marked as required. Patient was prepped and draped in the usual sterile fashion.           Sports Medicine Office Note    Today's Date:  10/14/2024     HPI: Haroon Parsons is a 78 y.o. 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    Today, 10/14/2024, he presents for evaluation of chronic right knee pain.  He is a poor historian and does not come with any family members.  He states he lives with his wife in his own home in a multilevel dwelling but mainly stays on the ground floor.  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  He ambulates with a cane and assistance in his right leg.  He has chronic right knee pain worse to the medial side.  He denies instability.  He is unsure of previous treatments but [facility] reveals he saw Dr. Drew at [facility] in 2023, receiving cortisone injections on 1/12/2023 and again on 4/20/2023.  It is documented by Dr. Drew the patient had good relief; but the patient states it did not help him.  He denies any recent injury or trauma to his knee.  Has no problems with the left knee.    He has no other complaints.    Physical Examination:     The RIGHT knee has trace joint  effusion. Patella crepitus and grind are positive. There is minimal tenderness to the medial and lateral joint lines. Flexion and extension are without mechanical blocking. There is no instability with stress testing.   The LEFT knee is nontender and stable.  Skin - no rashes, sores, or open lesions. Strength, sensory and vascular exams are otherwise normal. There is no clubbing, cyanosis or edema.  He has obvious limited use of the left upper extremity.  Gait is slightly antalgic and cane assisted.    Imaging:  Radiographs of the right knee obtained today were reviewed and revealed severe end-stage medial compartment and severe patellofemoral compartment arthrosis.  There are no signs of acute fractures or dislocations.  There is significant advancement to the medial compartment as compared to x-rays from January 2023 available in our health system.  The studies were reviewed by me personally in the office today.      Procedure:  After consent was obtained, the right knee was prepped in a sterile fashion. Ultrasound guidance was used to help insure proper needle placement into the knee joint, decrease patient discomfort, and decrease collateral damage. The joint was visualized and Depo-Medrol 80 mg with lidocaine 4 mL & ropivacaine 4 mL were injected without any complications. Ultrasound images were saved on an internal file for later reference. The patient tolerated the procedure well and the area was cleaned and bandaged.    Problem List Items Addressed This Visit             ICD-10-CM    Hemiplegia and hemiparesis following cerebral infarction affecting left non-dominant side (Multi) I69.354     Other Visit Diagnoses         Codes    Primary osteoarthritis of right knee    -  Primary M17.11    Relevant Orders    Point of Care Ultrasound (Completed)    Right knee pain, unspecified chronicity     M25.561    Relevant Orders    XR knee right 4+ views            Assessment and Plan:     We reviewed the exam and x-ray  findings and discussed the conservative and surgical treatment options. We agreed to treat his pain conservatively with a cortisone injection at the right knee.  He tolerated this well.  Activity modifications were reviewed.  I encouraged him to maximize Tylenol.  He does have end-stage right knee DJD and would clearly benefit from joint replacement but due to his age and medical comorbidities he is probably not eligible for surgery.  He does not have instability and therefore would not benefit from a knee brace.  He will follow-up in 1 month to see how he responded to the cortisone.    **This note was dictated using Dragon speech recognition software and was not corrected for spelling or grammatical errors**.    Devon Davila MD  Sports Medicine Specialist  [facility] [place]

## 2024-11-25 ENCOUNTER — OFFICE VISIT (OUTPATIENT)
Dept: ORTHOPEDIC SURGERY | Facility: CLINIC | Age: 79
End: 2024-11-25
Payer: MEDICARE

## 2024-11-25 DIAGNOSIS — M25.361 KNEE INSTABILITY, RIGHT: ICD-10-CM

## 2024-11-25 DIAGNOSIS — M17.11 PRIMARY OSTEOARTHRITIS OF RIGHT KNEE: Primary | ICD-10-CM

## 2024-11-25 PROCEDURE — L1812 KO ELASTIC W/JOINTS PRE OTS: HCPCS | Performed by: FAMILY MEDICINE

## 2024-11-25 PROCEDURE — 99214 OFFICE O/P EST MOD 30 MIN: CPT | Performed by: FAMILY MEDICINE

## 2024-11-25 PROCEDURE — 1123F ACP DISCUSS/DSCN MKR DOCD: CPT | Performed by: FAMILY MEDICINE

## 2024-11-25 NOTE — PROGRESS NOTES
Patient ID: Haroon Parsons is a 79 y.o. male.    Sports Medicine Office Note    Today's Date:  11/25/2024     HPI: Haroon Parsons is a 79 y.o. retired male with history of recent CVA of the left upper and lower extremity and chronic right knee DJD pain who returns today for f/up of his chronic right knee pain .    10/14/2024, he presents for evaluation of chronic right knee pain upon referral by his PCP.  He is a poor historian and does not come with any family members.  He states he lives with his wife in his own home in a multilevel dwelling but mainly stays on the ground floor.  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  He ambulates with a cane and assistance in his right leg.  He has chronic right knee pain worse to the medial side.  He denies instability.  He is unsure of previous treatments but [facility] reveals he saw Dr. Drew at [facility] in 2023, receiving cortisone injections on 1/12/2023 and again on 4/20/2023.  It is documented by Dr. Drew the patient had good relief; but the patient states it did not help him.  He denies any recent injury or trauma to his knee.  Has no problems with the left knee.  We agreed to treat his pain conservatively with a cortisone injection at the right knee.  He tolerated this well.  Activity modifications were reviewed.  I encouraged him to maximize Tylenol.  He does have end-stage right knee DJD and would clearly benefit from joint replacement but due to his age and medical comorbidities he is probably not eligible for surgery.  He does not have instability and therefore would not benefit from a knee brace.  He will follow-up in 1 month to see how he responded to the cortisone.    Today, 11/25/2024, he returns for 6-week follow-up status post cortisone injection for severe right knee DJD.  He reports no improvement with cortisone injection.  This is similar to previous injections from other community providers.  He denies  interval injury or trauma.  He takes Tylenol and is on a blood thinner.  He does not use any bracing.    He has no other complaints.    Physical Examination:     The RIGHT knee has trace joint effusion. Patella crepitus and grind are positive.  Valgus stress test produces laxity but no pain.  There is minimal tenderness to the medial and lateral joint lines. Flexion and extension are without mechanical blocking. There is no instability with stress testing.   The LEFT knee is nontender and stable.  Skin - no rashes, sores, or open lesions. Strength, sensory and vascular exams are otherwise normal. There is no clubbing, cyanosis or edema.  He has obvious limited use of the left upper extremity.  Gait is slightly antalgic and cane assisted.    Imaging:  === 10/14/24 ===  XR KNEE RIGHT 4+ VIEWS  - Impression -  1. Severe medial and mild lateral/patellofemoral compartment  osteoarthrosis.  Signed by: Teena Lopez 10/16/2024 7:00 PM      Problem List Items Addressed This Visit    None  Visit Diagnoses         Codes    Primary osteoarthritis of right knee    -  Primary M17.11    Knee instability, right     M25.361            Assessment and Plan:     We reviewed the exam and x-ray findings and discussed the conservative and surgical treatment options. We agreed that he failed our repeat attempts at conservative management with cortisone injection.  I do not feel he would benefit from a trial of HA either.  We discussed maximizing APAP and topical diclofenac.  He will check with his medical providers to have him on blood thinners.  We agreed to try a medial  brace.  I offered referral for joint replacement but they have no interest.  He will follow-up with his PCP for chronic pain control.    **Patient was prescribed a reaction OA medial  for knee DJD and instability.The patient is ambulatory with or without aid; but, has weakness, instability and/or deformity of their right lower extremity which requires  stabilization from this orthosis to improve their function.      Verbal and written instructions for the use, wear schedule, cleaning and application of this item were given.  Patient was instructed that should the brace result in increased pain, decreased sensation, increased swelling, or an overall worsening of their medical condition, to please contact our office immediately.     Orthotic management and training was provided for skin care, modifications due to healing tissues, edema changes, interruption in skin integrity, and safety precautions with the orthosis.      **This note was dictated using Dragon speech recognition software and was not corrected for spelling or grammatical errors**.    Devon Davila MD  Sports Medicine Specialist  [facility] [place]

## 2024-12-20 DIAGNOSIS — G50.0 TRIGEMINAL NEURALGIA: Primary | ICD-10-CM

## 2024-12-20 RX ORDER — CARBAMAZEPINE 200 MG/1
300 TABLET ORAL 3 TIMES DAILY
Qty: 90 TABLET | Refills: 1 | Status: SHIPPED | OUTPATIENT
Start: 2024-12-20

## 2025-02-11 DIAGNOSIS — M17.10 ARTHRITIS OF KNEE: Primary | ICD-10-CM

## 2025-02-11 DIAGNOSIS — J45.909 REACTIVE AIRWAY DISEASE WITHOUT COMPLICATION, UNSPECIFIED ASTHMA SEVERITY, UNSPECIFIED WHETHER PERSISTENT (HHS-HCC): ICD-10-CM

## 2025-02-11 DIAGNOSIS — M17.9 OSTEOARTHRITIS OF KNEE, UNSPECIFIED LATERALITY, UNSPECIFIED OSTEOARTHRITIS TYPE: Primary | ICD-10-CM

## 2025-02-11 DIAGNOSIS — G50.0 TRIGEMINAL NEURALGIA: ICD-10-CM

## 2025-02-11 RX ORDER — ALBUTEROL SULFATE 90 UG/1
1 INHALANT RESPIRATORY (INHALATION) EVERY 4 HOURS PRN
Qty: 36 G | Refills: 0 | Status: SHIPPED | OUTPATIENT
Start: 2025-02-11

## 2025-02-11 RX ORDER — CARBAMAZEPINE 200 MG/1
300 TABLET ORAL 3 TIMES DAILY
Qty: 90 TABLET | Refills: 1 | Status: SHIPPED | OUTPATIENT
Start: 2025-02-11

## 2025-02-11 NOTE — TELEPHONE ENCOUNTER
Patient's wife called, said needs new prescription for handicapped plaque. Please advise when prescription is mailed to home.    Patient is having difficulty on his medications. Inhaler, pharmacy: [facility] [address] in [place].

## 2025-03-13 ENCOUNTER — APPOINTMENT (OUTPATIENT)
Dept: PRIMARY CARE | Facility: CLINIC | Age: 80
End: 2025-03-13
Payer: MEDICARE

## 2025-03-17 DIAGNOSIS — G50.0 TRIGEMINAL NEURALGIA: ICD-10-CM

## 2025-03-17 RX ORDER — CARBAMAZEPINE 200 MG/1
300 TABLET ORAL 3 TIMES DAILY
Qty: 90 TABLET | Refills: 1 | Status: SHIPPED | OUTPATIENT
Start: 2025-03-17

## 2025-04-15 DIAGNOSIS — J45.909 REACTIVE AIRWAY DISEASE WITHOUT COMPLICATION, UNSPECIFIED ASTHMA SEVERITY, UNSPECIFIED WHETHER PERSISTENT (HHS-HCC): ICD-10-CM

## 2025-04-15 RX ORDER — ALBUTEROL SULFATE 90 UG/1
INHALANT RESPIRATORY (INHALATION)
Qty: 36 G | Refills: 2 | Status: SHIPPED | OUTPATIENT
Start: 2025-04-15

## 2025-04-24 PROBLEM — M79.2 NEURALGIA: Status: ACTIVE | Noted: 2025-04-24

## 2025-04-24 PROBLEM — Z86.79 HISTORY OF ATRIAL FIBRILLATION: Status: ACTIVE | Noted: 2025-04-24

## 2025-04-24 PROBLEM — E78.5 DYSLIPIDEMIA: Status: ACTIVE | Noted: 2025-04-24

## 2025-04-24 PROBLEM — Z20.822 CONTACT WITH AND (SUSPECTED) EXPOSURE TO COVID-19: Status: ACTIVE | Noted: 2023-02-05

## 2025-04-24 PROBLEM — R06.00 DYSPNEA: Status: ACTIVE | Noted: 2025-04-24

## 2025-04-24 PROBLEM — J44.9 COPD (CHRONIC OBSTRUCTIVE PULMONARY DISEASE) (MULTI): Status: ACTIVE | Noted: 2023-02-05

## 2025-04-24 PROBLEM — I21.9 MYOCARDIAL INFARCT (MULTI): Status: ACTIVE | Noted: 2024-01-01

## 2025-04-24 PROBLEM — F17.200 NICOTINE USE DISORDER: Status: ACTIVE | Noted: 2019-10-26

## 2025-04-24 PROBLEM — R20.9 SENSORY DISTURBANCE: Status: ACTIVE | Noted: 2025-04-24

## 2025-04-24 PROBLEM — J96.91 RESPIRATORY FAILURE WITH HYPOXIA: Status: ACTIVE | Noted: 2023-02-05

## 2025-04-24 PROBLEM — S42.023B: Status: ACTIVE | Noted: 2023-06-20

## 2025-04-24 PROBLEM — G45.9 TIA (TRANSIENT ISCHEMIC ATTACK): Status: ACTIVE | Noted: 2025-04-24

## 2025-04-24 PROBLEM — R07.81 RIB PAIN ON LEFT SIDE: Status: ACTIVE | Noted: 2025-04-24

## 2025-04-24 PROBLEM — R74.8 ELEVATED LIVER ENZYMES: Status: ACTIVE | Noted: 2025-04-24

## 2025-04-24 PROBLEM — R05.9 COUGH: Status: ACTIVE | Noted: 2025-04-24

## 2025-04-24 PROBLEM — R23.3 BRUISES EASILY: Status: ACTIVE | Noted: 2025-04-24

## 2025-04-24 PROBLEM — M17.11 ARTHRITIS OF KNEE, RIGHT: Status: ACTIVE | Noted: 2025-04-24

## 2025-04-24 PROBLEM — D72.829 LEUKOCYTOSIS: Status: ACTIVE | Noted: 2025-04-24

## 2025-04-24 PROBLEM — R09.02 HYPOXIA: Status: ACTIVE | Noted: 2025-04-24

## 2025-04-24 PROBLEM — R79.89 LOW VITAMIN D LEVEL: Status: ACTIVE | Noted: 2025-04-24

## 2025-04-24 PROBLEM — I63.9 CEREBROVASCULAR ACCIDENT (CVA) (MULTI): Status: ACTIVE | Noted: 2025-04-24

## 2025-04-24 PROBLEM — S63.509A WRIST SPRAIN: Status: ACTIVE | Noted: 2025-04-24

## 2025-04-24 PROBLEM — R51.9 FACIAL PAIN: Status: ACTIVE | Noted: 2025-04-24

## 2025-04-24 PROBLEM — I47.10 SUPRAVENTRICULAR TACHYCARDIA: Status: ACTIVE | Noted: 2024-01-01

## 2025-04-24 PROBLEM — J32.9 SINUSITIS: Status: ACTIVE | Noted: 2025-04-24

## 2025-04-24 PROBLEM — T81.40XA POST-OPERATIVE INFECTION: Status: ACTIVE | Noted: 2025-04-24

## 2025-04-24 PROBLEM — S30.1XXA HEMATOMA OF RECTUS SHEATH: Status: ACTIVE | Noted: 2024-04-26

## 2025-04-24 PROBLEM — M25.561 RIGHT KNEE PAIN: Status: ACTIVE | Noted: 2023-04-20

## 2025-04-24 PROBLEM — J45.909 ASTHMA: Status: ACTIVE | Noted: 2025-04-24

## 2025-04-24 PROBLEM — J06.9 UPPER RESPIRATORY INFECTION: Status: ACTIVE | Noted: 2025-04-24

## 2025-04-24 PROBLEM — I49.1 PAC (PREMATURE ATRIAL CONTRACTION): Status: ACTIVE | Noted: 2024-04-22

## 2025-04-24 PROBLEM — H90.3 ASNHL (ASYMMETRICAL SENSORINEURAL HEARING LOSS): Status: ACTIVE | Noted: 2025-04-24

## 2025-04-24 PROBLEM — G89.29 CHRONIC PAIN: Status: ACTIVE | Noted: 2025-04-24

## 2025-04-24 PROBLEM — M19.90 ARTHRITIS: Status: ACTIVE | Noted: 2023-04-20

## 2025-04-24 PROBLEM — H91.90 DECREASED HEARING: Status: ACTIVE | Noted: 2025-04-24

## 2025-04-24 PROBLEM — N52.9 MALE ERECTILE DISORDER OF ORGANIC ORIGIN: Status: ACTIVE | Noted: 2025-04-24

## 2025-04-24 PROBLEM — D64.9 ANEMIA: Status: ACTIVE | Noted: 2025-04-24

## 2025-05-27 ENCOUNTER — TELEPHONE (OUTPATIENT)
Dept: PRIMARY CARE | Facility: CLINIC | Age: 80
End: 2025-05-27
Payer: MEDICARE

## 2025-05-27 DIAGNOSIS — J45.909 REACTIVE AIRWAY DISEASE WITHOUT COMPLICATION, UNSPECIFIED ASTHMA SEVERITY, UNSPECIFIED WHETHER PERSISTENT (HHS-HCC): ICD-10-CM

## 2025-05-27 DIAGNOSIS — G50.0 TRIGEMINAL NEURALGIA: ICD-10-CM

## 2025-05-27 RX ORDER — CARBAMAZEPINE 200 MG/1
300 TABLET ORAL 3 TIMES DAILY
Qty: 405 TABLET | Refills: 3 | Status: SHIPPED | OUTPATIENT
Start: 2025-05-27

## 2025-05-27 RX ORDER — ALBUTEROL SULFATE 90 UG/1
1 INHALANT RESPIRATORY (INHALATION) EVERY 4 HOURS PRN
Qty: 54 G | Refills: 3 | Status: SHIPPED | OUTPATIENT
Start: 2025-05-27

## 2025-05-27 NOTE — TELEPHONE ENCOUNTER
Patient needs a refill on Carbamazepine/pharmacy: [facility] [address] and also needs a refill on Albuterol inhaler. Patient is out of both medications.

## 2025-06-28 ENCOUNTER — TELEPHONE (OUTPATIENT)
Dept: PRIMARY CARE | Facility: CLINIC | Age: 80
End: 2025-06-28
Payer: MEDICARE

## 2025-06-28 NOTE — TELEPHONE ENCOUNTER
Received page from Officer Jonnathan with [employer] re: patient death. No signs of foul play reported. Agreed for PCP to sign death certificate and will be addressed/signed during business hours.

## 2025-06-30 ENCOUNTER — TELEPHONE (OUTPATIENT)
Dept: PRIMARY CARE | Facility: CLINIC | Age: 80
End: 2025-06-30
Payer: MEDICARE

## 2025-06-30 NOTE — TELEPHONE ENCOUNTER
[facility]  home called- Pt has passed away and they are needing a signed death certificate. Certificate was uploaded to [employer]. Day and time of death is 07.17 on 25. Services are  and are needing this signed before hand

## 2025-07-01 ENCOUNTER — APPOINTMENT (OUTPATIENT)
Dept: PRIMARY CARE | Facility: CLINIC | Age: 80
End: 2025-07-01
Payer: MEDICARE

## 2025-09-16 ENCOUNTER — APPOINTMENT (OUTPATIENT)
Dept: PRIMARY CARE | Facility: CLINIC | Age: 80
End: 2025-09-16
Payer: MEDICARE